# Patient Record
Sex: FEMALE | Race: BLACK OR AFRICAN AMERICAN | NOT HISPANIC OR LATINO | URBAN - METROPOLITAN AREA
[De-identification: names, ages, dates, MRNs, and addresses within clinical notes are randomized per-mention and may not be internally consistent; named-entity substitution may affect disease eponyms.]

---

## 2023-02-08 ENCOUNTER — WEB ENCOUNTER (OUTPATIENT)
Dept: URBAN - METROPOLITAN AREA CLINIC 107 | Facility: CLINIC | Age: 63
End: 2023-02-08

## 2023-02-09 ENCOUNTER — OFFICE VISIT (OUTPATIENT)
Dept: URBAN - METROPOLITAN AREA CLINIC 107 | Facility: CLINIC | Age: 63
End: 2023-02-09
Payer: COMMERCIAL

## 2023-02-09 VITALS
TEMPERATURE: 98.2 F | BODY MASS INDEX: 28.25 KG/M2 | SYSTOLIC BLOOD PRESSURE: 136 MMHG | DIASTOLIC BLOOD PRESSURE: 91 MMHG | HEART RATE: 54 BPM | WEIGHT: 180 LBS | HEIGHT: 67 IN

## 2023-02-09 DIAGNOSIS — K58.1 IRRITABLE BOWEL SYNDROME WITH CONSTIPATION: ICD-10-CM

## 2023-02-09 DIAGNOSIS — R10.84 GENERALIZED ABDOMINAL PAIN: ICD-10-CM

## 2023-02-09 PROBLEM — 440630006: Status: ACTIVE | Noted: 2023-02-09

## 2023-02-09 PROBLEM — 35298007: Status: ACTIVE | Noted: 2023-02-09

## 2023-02-09 PROCEDURE — 99244 OFF/OP CNSLTJ NEW/EST MOD 40: CPT | Performed by: INTERNAL MEDICINE

## 2023-02-09 PROCEDURE — 99203 OFFICE O/P NEW LOW 30 MIN: CPT | Performed by: INTERNAL MEDICINE

## 2023-02-09 RX ORDER — PRUCALOPRIDE 2 MG/1
1 TABLET TABLET, FILM COATED ORAL ONCE A DAY
Qty: 30 | OUTPATIENT
Start: 2023-02-09 | End: 2023-03-11

## 2023-02-09 NOTE — HPI-TODAY'S VISIT:
61 y/o female referred by Dr. Mel Messina for bowel movement issues. She has chronic constipation and has IBS-C. She has to take laxatives to have a bowel movent every week. She was on Linzess, Trulance, Amitiza, Lactulose. She either had too much diarrhea or was sick to her stomach.

## 2023-03-23 ENCOUNTER — TELEPHONE ENCOUNTER (OUTPATIENT)
Dept: URBAN - METROPOLITAN AREA CLINIC 107 | Facility: CLINIC | Age: 63
End: 2023-03-23

## 2023-04-13 ENCOUNTER — TELEPHONE ENCOUNTER (OUTPATIENT)
Dept: URBAN - METROPOLITAN AREA CLINIC 113 | Facility: CLINIC | Age: 63
End: 2023-04-13

## 2023-07-07 ENCOUNTER — OFFICE VISIT (OUTPATIENT)
Dept: URBAN - METROPOLITAN AREA CLINIC 107 | Facility: CLINIC | Age: 63
End: 2023-07-07
Payer: COMMERCIAL

## 2023-07-07 VITALS
WEIGHT: 180 LBS | HEIGHT: 67 IN | RESPIRATION RATE: 16 BRPM | TEMPERATURE: 96.1 F | HEART RATE: 55 BPM | DIASTOLIC BLOOD PRESSURE: 64 MMHG | BODY MASS INDEX: 28.25 KG/M2 | SYSTOLIC BLOOD PRESSURE: 96 MMHG

## 2023-07-07 DIAGNOSIS — K58.1 IRRITABLE BOWEL SYNDROME WITH CONSTIPATION: ICD-10-CM

## 2023-07-07 DIAGNOSIS — R10.13 EPIGASTRIC PAIN: ICD-10-CM

## 2023-07-07 DIAGNOSIS — K59.01 SLOW TRANSIT CONSTIPATION: ICD-10-CM

## 2023-07-07 PROCEDURE — 99214 OFFICE O/P EST MOD 30 MIN: CPT

## 2023-07-07 NOTE — HPI-TODAY'S VISIT:
62-year-old female presents for follow-up.  She was last seen on 2/9/2023.  Due to her ongoing abdominal pain she was planned for CT scan of the abdomen/pelvis.  She was also started on Motegrity for her irritable bowel syndrome with constipation. CT scan of the abdomen/pelvis with IV contrast 2/13/2023:No acute intra-abdominal or pelvic process.  Liver, gallbladder, spleen, pancreas, and gastrointestinal tract are normal. Labs 6/8/2023:Hemoglobin A1c 6.3, vitamin D 20.9, low potassium 3.2, glucose 111, normal LFTs, normal CBC, normal lipid panel.   She takes an all natural laxative by [employer] every other day. This provides an every other day BM. She continues to have upper abdominal/epigastric pain. She describes a "yuck" feeling and pressure in that area. Denies sharp pain. She has occasional nausea without vomiting. She takes a daisy tablet for nausea. Motegrity causes severe diarrhea. Ibsrela caused a similar issue.  2/9/2023 63 y/o female referred by Dr. Mel Messina for bowel movement issues. She has chronic constipation and has IBS-C. She has to take laxatives to have a bowel movement every week. She was on Linzess, Trulance, Amitiza, Lactulose. She either had too much diarrhea or was sick to her stomach.

## 2023-08-07 ENCOUNTER — OUT OF OFFICE VISIT (OUTPATIENT)
Dept: URBAN - METROPOLITAN AREA SURGERY CENTER 25 | Facility: SURGERY CENTER | Age: 63
End: 2023-08-07
Payer: COMMERCIAL

## 2023-08-07 ENCOUNTER — CLAIMS CREATED FROM THE CLAIM WINDOW (OUTPATIENT)
Dept: URBAN - METROPOLITAN AREA CLINIC 4 | Facility: CLINIC | Age: 63
End: 2023-08-07
Payer: COMMERCIAL

## 2023-08-07 DIAGNOSIS — R10.13 EPIGASTRIC PAIN: ICD-10-CM

## 2023-08-07 DIAGNOSIS — R12 HEARTBURN: ICD-10-CM

## 2023-08-07 DIAGNOSIS — K29.70 GASTRITIS, UNSPECIFIED, WITHOUT BLEEDING: ICD-10-CM

## 2023-08-07 DIAGNOSIS — K29.70 GASTRITIS: ICD-10-CM

## 2023-08-07 DIAGNOSIS — K44.9 HIATAL HERNIA: ICD-10-CM

## 2023-08-07 DIAGNOSIS — K31.89 OTHER DISEASES OF STOMACH AND DUODENUM: ICD-10-CM

## 2023-08-07 PROCEDURE — 43239 EGD BIOPSY SINGLE/MULTIPLE: CPT | Performed by: INTERNAL MEDICINE

## 2023-08-07 PROCEDURE — 00731 ANES UPR GI NDSC PX NOS: CPT | Performed by: ANESTHESIOLOGIST ASSISTANT

## 2023-08-07 PROCEDURE — 88305 TISSUE EXAM BY PATHOLOGIST: CPT | Performed by: PATHOLOGY

## 2023-08-07 PROCEDURE — G8907 PT DOC NO EVENTS ON DISCHARG: HCPCS | Performed by: INTERNAL MEDICINE

## 2023-08-07 PROCEDURE — 00731 ANES UPR GI NDSC PX NOS: CPT | Performed by: ANESTHESIOLOGY

## 2023-08-07 PROCEDURE — 88312 SPECIAL STAINS GROUP 1: CPT | Performed by: PATHOLOGY

## 2023-09-15 ENCOUNTER — OFFICE VISIT (OUTPATIENT)
Dept: URBAN - METROPOLITAN AREA CLINIC 107 | Facility: CLINIC | Age: 63
End: 2023-09-15
Payer: COMMERCIAL

## 2023-09-15 VITALS
TEMPERATURE: 97.6 F | BODY MASS INDEX: 27.94 KG/M2 | HEIGHT: 67 IN | SYSTOLIC BLOOD PRESSURE: 129 MMHG | WEIGHT: 178 LBS | RESPIRATION RATE: 16 BRPM | DIASTOLIC BLOOD PRESSURE: 79 MMHG | HEART RATE: 51 BPM

## 2023-09-15 DIAGNOSIS — K58.1 IRRITABLE BOWEL SYNDROME WITH CONSTIPATION: ICD-10-CM

## 2023-09-15 DIAGNOSIS — R68.81 EARLY SATIETY: ICD-10-CM

## 2023-09-15 DIAGNOSIS — K59.01 SLOW TRANSIT CONSTIPATION: ICD-10-CM

## 2023-09-15 DIAGNOSIS — R10.13 EPIGASTRIC PAIN: ICD-10-CM

## 2023-09-15 PROCEDURE — 99214 OFFICE O/P EST MOD 30 MIN: CPT

## 2023-09-15 RX ORDER — VERAPAMIL HYDROCHLORIDE 240 MG/1
1 TABLET TABLET, FILM COATED, EXTENDED RELEASE ORAL ONCE A DAY
Status: ACTIVE | COMMUNITY

## 2023-09-15 RX ORDER — HYDROCHLOROTHIAZIDE 25 MG/1
1 TABLET IN THE MORNING TABLET ORAL ONCE A DAY
Status: ACTIVE | COMMUNITY

## 2023-09-15 RX ORDER — OMEGA-3/DHA/EPA/FISH OIL 120-180 MG
1 CAPSULE CAPSULE ORAL ONCE A DAY
Status: ACTIVE | COMMUNITY

## 2023-09-15 RX ORDER — CARVEDILOL 6.25 MG/1
1 TABLET WITH FOOD TABLET, FILM COATED ORAL TWICE A DAY
Status: ACTIVE | COMMUNITY

## 2023-09-15 RX ORDER — POTASSIUM CHLORIDE 1500 MG/1
1 TABLET WITH FOOD TABLET, EXTENDED RELEASE ORAL ONCE A DAY
Status: ACTIVE | COMMUNITY

## 2023-09-15 RX ORDER — VENLAFAXINE HYDROCHLORIDE 150 MG/1
1 CAPSULE WITH FOOD CAPSULE, EXTENDED RELEASE ORAL ONCE A DAY
Status: ACTIVE | COMMUNITY

## 2023-09-15 RX ORDER — SIMVASTATIN 80 MG
AS DIRECTED TABLET ORAL
Status: ACTIVE | COMMUNITY

## 2023-09-15 RX ORDER — LOSARTAN POTASSIUM 100 MG/1
1 TABLET TABLET ORAL ONCE A DAY
Status: ACTIVE | COMMUNITY

## 2023-09-15 NOTE — HPI-TODAY'S VISIT:
62-year-old female presents for follow-up after EGD.  She was last seen on 7/7/2023.  EGD 8/7/2023:Normal esophagus.  Gastritis noted and biopsied. Pathology revealed no significant abnormality. Normal duodenum.  Small hiatal hernia.    She continues to have epigastric discomfort. She describes this has a mild pain and fullness. She does have early satiety. She was diagnosed with pre-diabetes in the past. She tried increasing her [employer] supplement to daily use. THis was ineffective. SHe is back to every other day with a BM every other day.  7/7/2023:  62-year-old female presents for follow-up.  She was last seen on 2/9/2023.  Due to her ongoing abdominal pain she was planned for CT scan of the abdomen/pelvis.  She was also started on Motegrity for her irritable bowel syndrome with constipation.  CT scan of the abdomen/pelvis with IV contrast 2/13/2023:No acute intra-abdominal or pelvic process.  Liver, gallbladder, spleen, pancreas, and gastrointestinal tract are normal.  Labs 6/8/2023:Hemoglobin A1c 6.3, vitamin D 20.9, low potassium 3.2, glucose 111, normal LFTs, normal CBC, normal lipid panel.   She takes an all natural laxative by [employer] every other day. This provides an every other day BM. She continues to have upper abdominal/epigastric pain. She describes a "yuck" feeling and pressure in that area. Denies sharp pain. She has occasional nausea without vomiting. She takes a daisy tablet for nausea. Motegrity causes severe diarrhea. Ibsrela caused a similar issue.  2/9/2023 63 y/o female referred by Dr. Mel Messina for bowel movement issues. She has chronic constipation and has IBS-C. She has to take laxatives to have a bowel movement every week. She was on Linzess, Trulance, Amitiza, Lactulose. She either had too much diarrhea or was sick to her stomach.

## 2023-10-03 ENCOUNTER — TELEPHONE ENCOUNTER (OUTPATIENT)
Dept: URBAN - METROPOLITAN AREA CLINIC 107 | Facility: CLINIC | Age: 63
End: 2023-10-03

## 2023-12-18 ENCOUNTER — OFFICE VISIT (OUTPATIENT)
Dept: URBAN - METROPOLITAN AREA CLINIC 107 | Facility: CLINIC | Age: 63
End: 2023-12-18
Payer: COMMERCIAL

## 2023-12-18 ENCOUNTER — DASHBOARD ENCOUNTERS (OUTPATIENT)
Age: 63
End: 2023-12-18

## 2023-12-18 VITALS
RESPIRATION RATE: 16 BRPM | BODY MASS INDEX: 29.19 KG/M2 | DIASTOLIC BLOOD PRESSURE: 64 MMHG | TEMPERATURE: 97.7 F | HEIGHT: 67 IN | SYSTOLIC BLOOD PRESSURE: 102 MMHG | WEIGHT: 186 LBS | HEART RATE: 46 BPM

## 2023-12-18 DIAGNOSIS — R68.81 EARLY SATIETY: ICD-10-CM

## 2023-12-18 DIAGNOSIS — K58.1 IRRITABLE BOWEL SYNDROME WITH CONSTIPATION: ICD-10-CM

## 2023-12-18 DIAGNOSIS — K59.01 SLOW TRANSIT CONSTIPATION: ICD-10-CM

## 2023-12-18 DIAGNOSIS — R10.13 EPIGASTRIC PAIN: ICD-10-CM

## 2023-12-18 PROCEDURE — 99213 OFFICE O/P EST LOW 20 MIN: CPT | Performed by: INTERNAL MEDICINE

## 2023-12-18 RX ORDER — SIMVASTATIN 80 MG
AS DIRECTED TABLET ORAL
Status: ACTIVE | COMMUNITY

## 2023-12-18 RX ORDER — HYDROCHLOROTHIAZIDE 25 MG/1
1 TABLET IN THE MORNING TABLET ORAL ONCE A DAY
Status: ACTIVE | COMMUNITY

## 2023-12-18 RX ORDER — CARVEDILOL 6.25 MG/1
1 TABLET WITH FOOD TABLET, FILM COATED ORAL TWICE A DAY
Status: ACTIVE | COMMUNITY

## 2023-12-18 RX ORDER — VENLAFAXINE HYDROCHLORIDE 150 MG/1
1 CAPSULE WITH FOOD CAPSULE, EXTENDED RELEASE ORAL ONCE A DAY
Status: ACTIVE | COMMUNITY

## 2023-12-18 RX ORDER — OMEGA-3/DHA/EPA/FISH OIL 120-180 MG
1 CAPSULE CAPSULE ORAL ONCE A DAY
Status: ACTIVE | COMMUNITY

## 2023-12-18 RX ORDER — LOSARTAN POTASSIUM 100 MG/1
1 TABLET TABLET ORAL ONCE A DAY
Status: ACTIVE | COMMUNITY

## 2023-12-18 RX ORDER — POTASSIUM CHLORIDE 1500 MG/1
1 TABLET WITH FOOD TABLET, EXTENDED RELEASE ORAL ONCE A DAY
Status: ACTIVE | COMMUNITY

## 2023-12-18 RX ORDER — VERAPAMIL HYDROCHLORIDE 240 MG/1
1 TABLET TABLET, FILM COATED, EXTENDED RELEASE ORAL ONCE A DAY
Status: ACTIVE | COMMUNITY

## 2023-12-18 NOTE — HPI-TODAY'S VISIT:
62 y/o female presenting for f/u. She was last seen in September 2023. She has a hx of GERD and gastritis. She also has a hiatal hernia. She had a GES in October 2023 which was normal. She has been doing well overall. She has been taking natural laxatives. She was having stomach aches and that is alot better. She began drinking lemon/daisy tea with probiotics.   9/15/23 62-year-old female presents for follow-up after EGD.  She was last seen on 7/7/2023.  EGD 8/7/2023:Normal esophagus.  Gastritis noted and biopsied. Pathology revealed no significant abnormality. Normal duodenum.  Small hiatal hernia.    She continues to have epigastric discomfort. She describes this has a mild pain and fullness. She does have early satiety. She was diagnosed with pre-diabetes in the past. She tried increasing her [employer] supplement to daily use. THis was ineffective. SHe is back to every other day with a BM every other day.  7/7/2023:  62-year-old female presents for follow-up.  She was last seen on 2/9/2023.  Due to her ongoing abdominal pain she was planned for CT scan of the abdomen/pelvis.  She was also started on Motegrity for her irritable bowel syndrome with constipation.  CT scan of the abdomen/pelvis with IV contrast 2/13/2023:No acute intra-abdominal or pelvic process.  Liver, gallbladder, spleen, pancreas, and gastrointestinal tract are normal.  Labs 6/8/2023:Hemoglobin A1c 6.3, vitamin D 20.9, low potassium 3.2, glucose 111, normal LFTs, normal CBC, normal lipid panel.   She takes an all natural laxative by [employer] every other day. This provides an every other day BM. She continues to have upper abdominal/epigastric pain. She describes a "yuck" feeling and pressure in that area. Denies sharp pain. She has occasional nausea without vomiting. She takes a daisy tablet for nausea. Motegrity causes severe diarrhea. Ibsrela caused a similar issue.  2/9/2023 63 y/o female referred by Dr. Mel Messina for bowel movement issues. She has chronic constipation and has IBS-C. She has to take laxatives to have a bowel movement every week. She was on Linzess, Trulance, Amitiza, Lactulose. She either had too much diarrhea or was sick to her stomach.